# Patient Record
(demographics unavailable — no encounter records)

---

## 2024-10-09 NOTE — PHYSICAL EXAM
[TextEntry] : There erythema in the medial and lateral nail borders and pain of the right hallux and the nail plate is painful to direct palpation.  No pus or discharge is present.

## 2024-10-09 NOTE — HISTORY OF PRESENT ILLNESS
[FreeTextEntry1] : Odilon Coker returns stating that a dog stepped on his right great toe and that the nail is killing him.  He states that he noticed it getting worse when he went to work today and he called for an appointment.  He stated that it has been getting more painful, so he called for an appointment.

## 2024-10-09 NOTE — ASSESSMENT
[FreeTextEntry1] : Assessment: Ingrown toenail, right hallux, medial and lateral borders.  Plan: I performed straightback procedures utilizing a 12.7 cm sterile box lock, double spring fine cut back D tip fine tip stainless steel nail splitter removing the medial and lateral borders of both the left and right hallux nails as far back as tolerable and applied Amerigel wound gel with sterile compression dressings. The patient was instructed to start soaking the feet in lukewarm water and Epsom salts, 2 tablespoons per pint for 20 minutes twice per day.  The patient was advised to dry the feet with a clean towel and then apply a sterile dressing to the area for the next 5 days. I advised the patient to notify the office right away if increased redness, swelling, pain, open wounds or discharge were observed.

## 2025-04-24 NOTE — ASSESSMENT
[FreeTextEntry1] : Assessment: Onychomycosis caused by T. Rubrum. Tinea pedis bilaterally. Xerosis with fissuring, bilateral heels.  Plan: I debrided each toenail via mechanical trimming and electrical grinding.  All toenails were trimmed with a 14 cm sterile stainless steel box lock double spring nail splitter.  Then utilizing a sterile pear shaped santa alexis (this device falls under bur, surgical, general & plastic surgery.  The FDA deems this item a Class-1 device) via a 35,000 RPM electric drill and vacuum and dust extractor system all toenails were aseptically debrided removing fungal layers.  This is done to diminish the fungal load of the toenails and enhance the effects of the antifungal medication, allowing overall improvement in the degree of fungal infection as well as improve appearance and reduce discomfort and help diminish chances of secondary bacterial infection, also lessening the chance of ingrown nails, especially when performed on a regular basis.  I instructed the patient to continue with the antifungal therapy provided everyday and use the Clean Sweep antifungal spray to disinfect their shoes, as continued improvement was noted. He was encouraged to call the office with any questions or problems as they may arise.  I discussed oral versus topical treatments with Odilon.  He felt at this time he would try the pills for the fungus on both the nails and the skin.  He stated that he recently had blood work from his regular doctor, which he will have forwarded here.  If all is within normal limits, I will start him on Lamisil.  PTR:  2 months.

## 2025-04-24 NOTE — PHYSICAL EXAM
[TextEntry] : The toenails were thick and discolored on the toes of both feet 1 - 5.  Mild subungual debris was noted on multiple toes as well as incurvation of the toenails.  Upon palpation pain was elicited.  There did not appear to be bacterial infection noted.  There are patches of red spotted skin in a moccasin distribution on both feet.  There are also partial- and full-thickness fissures on the plantar and posterior aspects of both heels.  Both areas are painful.